# Patient Record
Sex: FEMALE | Employment: UNEMPLOYED | URBAN - METROPOLITAN AREA
[De-identification: names, ages, dates, MRNs, and addresses within clinical notes are randomized per-mention and may not be internally consistent; named-entity substitution may affect disease eponyms.]

---

## 2023-03-27 ENCOUNTER — OFFICE VISIT (OUTPATIENT)
Dept: ORTHOPEDIC SURGERY | Age: 48
End: 2023-03-27
Payer: COMMERCIAL

## 2023-03-27 VITALS — HEIGHT: 64 IN | BODY MASS INDEX: 36.7 KG/M2 | WEIGHT: 215 LBS

## 2023-03-27 DIAGNOSIS — M25.531 WRIST PAIN, CHRONIC, RIGHT: ICD-10-CM

## 2023-03-27 DIAGNOSIS — M54.12 CERVICAL RADICULOPATHY: ICD-10-CM

## 2023-03-27 DIAGNOSIS — G89.29 WRIST PAIN, CHRONIC, RIGHT: ICD-10-CM

## 2023-03-27 DIAGNOSIS — M54.2 NECK PAIN: Primary | ICD-10-CM

## 2023-03-27 DIAGNOSIS — M50.30 DDD (DEGENERATIVE DISC DISEASE), CERVICAL: ICD-10-CM

## 2023-03-27 PROCEDURE — 99204 OFFICE O/P NEW MOD 45 MIN: CPT | Performed by: PHYSICIAN ASSISTANT

## 2023-03-27 RX ORDER — DICLOFENAC SODIUM 75 MG/1
75 TABLET, DELAYED RELEASE ORAL 2 TIMES DAILY PRN
Qty: 60 TABLET | Refills: 0 | Status: SHIPPED | OUTPATIENT
Start: 2023-03-27

## 2023-03-27 NOTE — LETTER
Michael Elam                                                     ALVARO HA  1975  MRN [id number]                                              ROOM NUMBER______      Radiographic Studies:    Cervical MRI      Thoracic MRI         Lumbar MRI          Pelvis MRI        CONTRAST    CT Myelogram: _______________   NCS/EMG ________________ ( UE  /  LE )     MRI of ___________________          Other: ____________________      Injections:    KNEE    HIP  Depomedrol _____ mg Euflexxa _____    _______________ TFESI/SNRB  _______________ SI Joint  _______________ YANG    _______________ Facet  _______________Piriformis/ Sciatica      Medications:    Oral Steroids _______________  NSAIDS _______________    Muscle Relaxers _______________  Neurontin/Lyrica _______________    Pain Medicine _______________  Other _______________                       Physical Therapy:    Lumbar     Thoracic      Cervical     Hip       Knee       Shoulder               Traction          Ultrasound          Dry Needling      Referral:    Pain referral:  [facility]   [facility]   Other: ______________________________    Follow-up/ Refer__________________________________________________    Authorization to hold blood thinners:___________________________________

## 2023-03-27 NOTE — PROGRESS NOTES
A referral to Hand/Elbow specialist has been ordered. A referral to pain management for Cervical YANG injections was ordered.
cervical injection with KAL, refer to [facility]. Oskar Tobias PA-C  03/27/23      Elements of this note were created using speech recognition software. As such, errors of speech recognition may be present.

## 2023-04-04 ENCOUNTER — OFFICE VISIT (OUTPATIENT)
Dept: ORTHOPEDIC SURGERY | Age: 48
End: 2023-04-04

## 2023-04-04 DIAGNOSIS — M19.90 INFLAMMATORY ARTHROPATHY: ICD-10-CM

## 2023-04-04 DIAGNOSIS — R20.2 NUMBNESS AND TINGLING: ICD-10-CM

## 2023-04-04 DIAGNOSIS — M65.9 SYNOVITIS AND TENOSYNOVITIS: Primary | ICD-10-CM

## 2023-04-04 DIAGNOSIS — M79.641 RIGHT HAND PAIN: ICD-10-CM

## 2023-04-04 DIAGNOSIS — R20.0 NUMBNESS AND TINGLING: ICD-10-CM

## 2023-04-04 RX ORDER — METHYLPREDNISOLONE 4 MG/1
TABLET ORAL
Qty: 1 KIT | Refills: 0 | Status: SHIPPED | OUTPATIENT
Start: 2023-04-04

## 2023-04-04 NOTE — PROGRESS NOTES
and tenosynovitis    2. Right hand pain    3. Numbness and tingling    4. Inflammatory arthropathy           Report: AP, lateral, oblique and thumb CMC joint hand XRs demonstrates well-maintained joint spaces, very small osteophyte on the trapezium, otherwise normal x-ray of the right hand and wrist    Impression: as above     Slava Dudley MD     Ultrasound examination of the right wrist demonstrates thickened synovium on the dorsal aspect of the carpus, no distinct ganglion cyst is identified, hyperechoic mass deep in the thenar eminence is also appreciated      Assessment:   1. Synovitis and tenosynovitis    2. Right hand pain    3. Numbness and tingling    4. Inflammatory arthropathy        Plan:   We discussed the diagnosis and different treatment options. We discussed observation, therapy, antiinflammatory medications and other pertinent treatment modalities. After discussing in detail the patient elects to proceed with Medrol Dosepak to help with current symptoms, nerve conduction study to assess for carpal tunnel syndrome, rheumatoid labs to assess for inflammatory disease given the patient's nonspecific wrist pain with a normal wrist x-ray and early morning stiffness. We will obtain a right wrist MRI given that her symptoms have been going on for a year and a half and this is severely impacting her everyday life. I will reassess after all tests are done. I stressed the importance of getting the cervical epidural steroid injection so that we can assess recovery from that. Patient voiced accordance and understanding of the information provided and the formulated plan. All questions were answered to the patient's satisfaction during the encounter.     Slava Dudley MD  Orthopaedic Surgery  04/04/23  9:39 AM

## 2023-04-18 ENCOUNTER — TELEPHONE (OUTPATIENT)
Dept: ORTHOPEDIC SURGERY | Age: 48
End: 2023-04-18

## 2023-04-18 NOTE — TELEPHONE ENCOUNTER
Patient called stating the pain mgmt she was sent to is not in network with her ins.  I told her she needed to call her insurance and see who is in network that she can see and we can make the referral.

## 2023-04-25 ENCOUNTER — TELEPHONE (OUTPATIENT)
Dept: ORTHOPEDIC SURGERY | Age: 48
End: 2023-04-25

## 2023-04-26 RX ORDER — DICLOFENAC SODIUM 75 MG/1
TABLET, DELAYED RELEASE ORAL
Qty: 60 TABLET | Refills: 0 | OUTPATIENT
Start: 2023-04-26

## 2023-05-01 ENCOUNTER — HOSPITAL ENCOUNTER (OUTPATIENT)
Dept: MAMMOGRAPHY | Age: 48
Discharge: HOME | End: 2023-05-04
Payer: COMMERCIAL

## 2023-05-01 DIAGNOSIS — Z12.31 ENCOUNTER FOR SCREENING MAMMOGRAM FOR MALIGNANT NEOPLASM OF BREAST: ICD-10-CM

## 2023-05-01 PROCEDURE — 77067 SCR MAMMO BI INCL CAD: CPT

## 2023-05-15 ENCOUNTER — PROCEDURE VISIT (OUTPATIENT)
Dept: PHYSICAL MEDICINE AND REHAB | Age: 48
End: 2023-05-15
Payer: COMMERCIAL

## 2023-05-15 VITALS — HEART RATE: 81 BPM | SYSTOLIC BLOOD PRESSURE: 108 MMHG | DIASTOLIC BLOOD PRESSURE: 70 MMHG

## 2023-05-15 DIAGNOSIS — M54.12 C6 RADICULOPATHY: Primary | ICD-10-CM

## 2023-05-15 PROBLEM — A04.8 HELICOBACTER PYLORI INFECTION: Status: ACTIVE | Noted: 2021-04-08

## 2023-05-15 PROBLEM — H52.03 HYPEROPIA WITH PRESBYOPIA OF BOTH EYES: Status: ACTIVE | Noted: 2020-06-15

## 2023-05-15 PROBLEM — H52.4 HYPEROPIA WITH PRESBYOPIA OF BOTH EYES: Status: ACTIVE | Noted: 2020-06-15

## 2023-05-15 PROBLEM — Z22.7 INACTIVE TUBERCULOSIS: Status: ACTIVE | Noted: 2023-05-15

## 2023-05-15 PROBLEM — H40.003 GLAUCOMA SUSPECT OF BOTH EYES: Status: ACTIVE | Noted: 2019-03-07

## 2023-05-15 PROCEDURE — 95913 NRV CNDJ TEST 13/> STUDIES: CPT | Performed by: PHYSICAL MEDICINE & REHABILITATION

## 2023-05-15 PROCEDURE — 95886 MUSC TEST DONE W/N TEST COMP: CPT | Performed by: PHYSICAL MEDICINE & REHABILITATION

## 2023-06-07 ENCOUNTER — OFFICE VISIT (OUTPATIENT)
Dept: ORTHOPEDIC SURGERY | Age: 48
End: 2023-06-07
Payer: COMMERCIAL

## 2023-06-07 DIAGNOSIS — M25.531 WRIST PAIN, CHRONIC, RIGHT: Primary | ICD-10-CM

## 2023-06-07 DIAGNOSIS — G89.29 WRIST PAIN, CHRONIC, RIGHT: Primary | ICD-10-CM

## 2023-06-07 DIAGNOSIS — M65.9 SYNOVITIS AND TENOSYNOVITIS: ICD-10-CM

## 2023-06-07 PROCEDURE — 20550 NJX 1 TENDON SHEATH/LIGAMENT: CPT | Performed by: ORTHOPAEDIC SURGERY

## 2023-06-07 PROCEDURE — 99214 OFFICE O/P EST MOD 30 MIN: CPT | Performed by: ORTHOPAEDIC SURGERY

## 2023-06-07 RX ORDER — MELOXICAM 15 MG/1
15 TABLET ORAL DAILY
Qty: 60 TABLET | Refills: 0 | Status: SHIPPED | OUTPATIENT
Start: 2023-06-07 | End: 2023-08-06

## 2023-06-07 RX ORDER — BETAMETHASONE SODIUM PHOSPHATE AND BETAMETHASONE ACETATE 3; 3 MG/ML; MG/ML
6 INJECTION, SUSPENSION INTRA-ARTICULAR; INTRALESIONAL; INTRAMUSCULAR; SOFT TISSUE ONCE
Status: COMPLETED | OUTPATIENT
Start: 2023-06-07 | End: 2023-06-07

## 2023-06-07 RX ADMIN — BETAMETHASONE SODIUM PHOSPHATE AND BETAMETHASONE ACETATE 6 MG: 3; 3 INJECTION, SUSPENSION INTRA-ARTICULAR; INTRALESIONAL; INTRAMUSCULAR; SOFT TISSUE at 10:38

## 2023-06-07 NOTE — PROGRESS NOTES
[facility]thopaedic Hand Clinic Note    Name: Stephany Nazario  Age: 52 y.o. YOB: 1975  Gender: female  MRN: [id number]      Follow up visit:   1. Wrist pain, chronic, right    2. Synovitis and tenosynovitis        HPI: Stephany Nazario is a 52 y.o. female who is following up for right arm, right leg pain, neck pain, right wrist pain, on the last visit I ordered rheumatoid work-up, nerve conduction study and an MRI of the right wrist, patient reports she is no better, the steroids helped somewhat but her symptoms are overall worse, she is having difficulty scheduling a cervical epidural steroid injection due to insurance issues where she was referred to. ROS/Meds/PSH/PMH/FH/SH: I personally reviewed the patients standard intake form. Pertinents are discussed in the HPI    Physical Examination:  General: Awake and alert. HEENT: Normocephalic, atraumatic  CV/Pulm: Breathing even and unlabored  Skin: No obvious rashes noted. Lymphatic: No obvious evidence of lymphedema or lymphadenopathy    Musculoskeletal Examination:  Examination on the right upper extremity demonstrates cap refill < 5 seconds in all fingers, mild tenderness palpation of the right thumb CMC joint with a positive grind test, right thumb MCP joint hyperextends 5 degrees, severe tenderness palpation of the radiocarpal joint dorsally more pronounced along the fourth extensor compartment, some discomfort with scaphoid shift test with clicking but no clunk, tenderness palpation of the thenar eminence.     Imaging / Electrodiagnostic Tests:     MRI of the right wrist was reviewed, this demonstrates synovitis of the fourth extensor compartment, otherwise relatively normal    All labs were reviewed including CRP, ESR, rheumatoid factor, antinuclear antibody, anti-CCP antibody, HLA-B27 and uric acid, these are all normal    Nerve conduction study was reviewed, this demonstrates changes consistent with C6 right radiculopathy, normal median

## 2023-06-09 ENCOUNTER — TELEPHONE (OUTPATIENT)
Dept: ORTHOPEDIC SURGERY | Age: 48
End: 2023-06-09

## 2023-06-09 NOTE — TELEPHONE ENCOUNTER
Spoke with patient and informed her that we have received correspondence from Ileana Bean that patient could not be reached due to phone being disconnected. She stated her number has never been disconnected. I gave her the number to the [address] [place] [address] Drive office so that she can call and schedule.

## 2023-08-03 DIAGNOSIS — G89.29 WRIST PAIN, CHRONIC, RIGHT: ICD-10-CM

## 2023-08-03 DIAGNOSIS — M25.531 WRIST PAIN, CHRONIC, RIGHT: ICD-10-CM

## 2023-08-03 RX ORDER — MELOXICAM 15 MG/1
TABLET ORAL
Qty: 60 TABLET | Refills: 0 | OUTPATIENT
Start: 2023-08-03

## 2024-06-16 ENCOUNTER — TRANSCRIBE ORDERS (OUTPATIENT)
Dept: SCHEDULING | Age: 49
End: 2024-06-16

## 2024-06-16 DIAGNOSIS — R10.9 ABDOMINAL PAIN, UNSPECIFIED ABDOMINAL LOCATION: Primary | ICD-10-CM
